# Patient Record
Sex: FEMALE | Race: BLACK OR AFRICAN AMERICAN | Employment: UNEMPLOYED | URBAN - METROPOLITAN AREA
[De-identification: names, ages, dates, MRNs, and addresses within clinical notes are randomized per-mention and may not be internally consistent; named-entity substitution may affect disease eponyms.]

---

## 2018-09-22 ENCOUNTER — IP HISTORICAL/CONVERTED ENCOUNTER (OUTPATIENT)
Dept: OTHER | Age: 58
End: 2018-09-22

## 2018-09-26 ENCOUNTER — ED HISTORICAL/CONVERTED ENCOUNTER (OUTPATIENT)
Dept: OTHER | Age: 58
End: 2018-09-26

## 2019-02-09 ENCOUNTER — OFFICE VISIT (OUTPATIENT)
Dept: FAMILY PLANNING/WOMEN'S HEALTH CLINIC | Age: 59
End: 2019-02-09

## 2019-02-09 ENCOUNTER — HOSPITAL ENCOUNTER (OUTPATIENT)
Dept: MAMMOGRAPHY | Age: 59
Discharge: HOME | End: 2019-02-09
Attending: NURSE PRACTITIONER

## 2019-02-09 ENCOUNTER — HOSPITAL ENCOUNTER (OUTPATIENT)
Dept: LAB | Age: 59
Discharge: HOME | End: 2019-02-09

## 2019-02-09 VITALS — DIASTOLIC BLOOD PRESSURE: 98 MMHG | SYSTOLIC BLOOD PRESSURE: 150 MMHG

## 2019-02-09 DIAGNOSIS — Z12.31 SCREENING MAMMOGRAM, ENCOUNTER FOR: ICD-10-CM

## 2019-02-09 DIAGNOSIS — Z12.31 SCREENING MAMMOGRAM, ENCOUNTER FOR: Primary | ICD-10-CM

## 2019-02-09 DIAGNOSIS — Z01.419 WELL WOMAN EXAM WITH ROUTINE GYNECOLOGICAL EXAM: ICD-10-CM

## 2019-02-09 PROCEDURE — 88175 CYTOPATH C/V AUTO FLUID REDO: CPT

## 2019-02-09 PROCEDURE — 77067 SCR MAMMO BI INCL CAD: CPT

## 2019-02-09 NOTE — PROGRESS NOTES
EVERY WOMANS LIFE HISTORY QUESTIONNAIRE       No Yes Comments   Has a doctor ever seen or felt anything wrong with your breast? [x]                                  []                                     Have you ever had a breast biopsy? [x]                                  []                                          When and where was last mammogram performed? [facility] about 4 years ago, states she had ultrasound. Program through [facility] and it was free. Have you ever been told that there was a problem on your mammogram?   No Yes Comments   [x]                                  []                                       Do you have breast implants? No Yes Comments   [x]                                  []                                       When was your last Pap test performed? 3 years ago in [facility]    Have you ever had an abnormal Pap test?   No Yes Comments   [x]                                  []                                       Have you had a hysterectomy? No Yes Comments (why)   [x]                                  []                                       Have you ever been diagnosed with any type of Cancer   No Yes Comments (type,when,where,type of treatment   [x]                                  []                                          Has a family member been diagnosed with breast or ovarian cancer? No Yes Comments (which family members, and type   [x]                                  []                                       Did your mother take DELROY? No Yes Unknown   [x]                                  []                                       Do you have a history of HIV exposure? No Yes    [x]                                  []                                         Have you been through menopause?    No Yes Date of LMP   []                                  [x]                                  26 years ago     Are you taking hormone replacement therapy (HRT) No Yes Comments   [x]                                  []                                       How many times have you been pregnant? 2          Number of live births ? 1    Are you experiencing any of the following? No Yes Comments   Nipple Discharge [x]                                  []                                     Breast Lump/Masses [x]                                  []                                     Breast Skin Changes [x]                                  []                                          No Yes Comments   Vaginal Discharge [x]                                  []                                     Abnormal/unusual vaginal bleeding [x]                                  []                                         Are you experiencing any other health problems?  On blood pressure med, unsure of name

## 2019-02-09 NOTE — PROGRESS NOTES
HISTORY OF PRESENT ILLNESS  Roel Varner is a 60 y.o. female. HPI   Ms. Varner is here for her EWL exam.  57yoF,   She denies abnormal SBE's, performs monthly. She denies abnormal vaginal discharge and bloating; no pelvic pain. LMP at age 34. Her last mammogram was about 4 years ago at [facility]. Her last Pap was at age 47. She is followed by a clinic down there for her BP. Today she has not taken her BP meds yet and is aware her BP is elevated. She recently had a colonosocpy- next one due in 10 years. Non-smoker. Review of Systems   Constitutional: Negative for chills, diaphoresis, malaise/fatigue and weight loss. Gastrointestinal: Negative for abdominal pain, blood in stool, constipation and diarrhea. Physical Exam   Constitutional: She is oriented to person, place, and time. She appears well-developed and well-nourished. Pulmonary/Chest: Right breast exhibits no inverted nipple, no mass, no nipple discharge, no skin change and no tenderness. Left breast exhibits no inverted nipple, no mass, no nipple discharge, no skin change and no tenderness. Breasts are symmetrical.   Genitourinary: Rectum normal, vagina normal and uterus normal. Rectal exam shows no mass, no tenderness and guaiac negative stool. Pelvic exam was performed with patient supine. There is no rash, tenderness or lesion on the right labia. There is no rash, tenderness or lesion on the left labia. Cervix exhibits no motion tenderness, no discharge and no friability. Right adnexum displays no mass, no tenderness and no fullness. Left adnexum displays no mass, no tenderness and no fullness. No erythema, tenderness or bleeding in the vagina. No vaginal discharge found. Lymphadenopathy:     She has no axillary adenopathy. Right: No inguinal and no supraclavicular adenopathy present. Left: No inguinal and no supraclavicular adenopathy present.    Neurological: She is alert and oriented to person, place, and time. Skin: Skin is warm and dry. Psychiatric: She has a normal mood and affect. Her behavior is normal. Thought content normal.   Nursing note and vitals reviewed. ASSESSMENT and PLAN  1. EWL exam  2. CBE benign  3. Screening mammogram today      -last mammogram 2015  4. Pap, Thin Prep obtained  5. BP GL's discussed per JNC8      -monitor BP x3 readings this week and report elevated findings to PCP  6.  Post-menopause      -LMP age 34

## 2019-06-12 ENCOUNTER — ED HISTORICAL/CONVERTED ENCOUNTER (OUTPATIENT)
Dept: OTHER | Age: 59
End: 2019-06-12

## 2019-09-01 ENCOUNTER — ED HISTORICAL/CONVERTED ENCOUNTER (OUTPATIENT)
Dept: OTHER | Age: 59
End: 2019-09-01

## 2020-06-01 ENCOUNTER — OP HISTORICAL/CONVERTED ENCOUNTER (OUTPATIENT)
Dept: OTHER | Age: 60
End: 2020-06-01

## 2020-08-18 ENCOUNTER — ED HISTORICAL/CONVERTED ENCOUNTER (OUTPATIENT)
Dept: OTHER | Age: 60
End: 2020-08-18

## 2020-12-29 ENCOUNTER — HOSPITAL ENCOUNTER (EMERGENCY)
Age: 60
Discharge: HOME | End: 2020-12-29
Attending: FAMILY MEDICINE
Payer: MEDICAID

## 2020-12-29 ENCOUNTER — APPOINTMENT (OUTPATIENT)
Dept: GENERAL RADIOLOGY | Age: 60
End: 2020-12-29
Attending: FAMILY MEDICINE
Payer: MEDICAID

## 2020-12-29 VITALS
RESPIRATION RATE: 16 BRPM | SYSTOLIC BLOOD PRESSURE: 152 MMHG | DIASTOLIC BLOOD PRESSURE: 93 MMHG | HEART RATE: 58 BPM | TEMPERATURE: 98.4 F | HEIGHT: 65 IN | BODY MASS INDEX: 27.49 KG/M2 | OXYGEN SATURATION: 100 % | WEIGHT: 165 LBS

## 2020-12-29 DIAGNOSIS — M94.0 COSTOCHONDRITIS: Primary | ICD-10-CM

## 2020-12-29 DIAGNOSIS — R51.9 ACUTE NONINTRACTABLE HEADACHE, UNSPECIFIED HEADACHE TYPE: ICD-10-CM

## 2020-12-29 LAB
ALBUMIN SERPL-MCNC: 4.1 G/DL (ref 3.5–5)
ALBUMIN/GLOB SERPL: 0.9 {RATIO} (ref 1.1–2.2)
ALP SERPL-CCNC: 69 U/L (ref 45–117)
ALT SERPL-CCNC: 19 U/L (ref 12–78)
ANION GAP SERPL CALC-SCNC: 9 MMOL/L (ref 5–15)
AST SERPL W P-5'-P-CCNC: 16 U/L (ref 15–37)
BILIRUB SERPL-MCNC: 0.3 MG/DL (ref 0.2–1)
BUN SERPL-MCNC: 21 MG/DL (ref 6–20)
BUN/CREAT SERPL: 23 (ref 12–20)
CA-I BLD-MCNC: 9.7 MG/DL (ref 8.5–10.1)
CHLORIDE SERPL-SCNC: 101 MMOL/L (ref 97–108)
CO2 SERPL-SCNC: 29 MMOL/L (ref 21–32)
CREAT SERPL-MCNC: 0.93 MG/DL (ref 0.55–1.02)
GLOBULIN SER CALC-MCNC: 4.7 G/DL (ref 2–4)
GLUCOSE SERPL-MCNC: 108 MG/DL (ref 65–100)
POTASSIUM SERPL-SCNC: 3.2 MMOL/L (ref 3.5–5.1)
PROT SERPL-MCNC: 8.8 G/DL (ref 6.4–8.2)
SODIUM SERPL-SCNC: 139 MMOL/L (ref 136–145)
TROPONIN I SERPL-MCNC: <0.05 NG/ML

## 2020-12-29 PROCEDURE — 71045 X-RAY EXAM CHEST 1 VIEW: CPT

## 2020-12-29 PROCEDURE — 84484 ASSAY OF TROPONIN QUANT: CPT

## 2020-12-29 PROCEDURE — 99284 EMERGENCY DEPT VISIT MOD MDM: CPT

## 2020-12-29 PROCEDURE — 96374 THER/PROPH/DIAG INJ IV PUSH: CPT

## 2020-12-29 PROCEDURE — 74011250636 HC RX REV CODE- 250/636: Performed by: FAMILY MEDICINE

## 2020-12-29 PROCEDURE — 93005 ELECTROCARDIOGRAM TRACING: CPT

## 2020-12-29 PROCEDURE — 80053 COMPREHEN METABOLIC PANEL: CPT

## 2020-12-29 PROCEDURE — 36415 COLL VENOUS BLD VENIPUNCTURE: CPT

## 2020-12-29 RX ORDER — POTASSIUM CHLORIDE 600 MG/1
8 TABLET, FILM COATED, EXTENDED RELEASE ORAL 2 TIMES DAILY
COMMUNITY

## 2020-12-29 RX ORDER — KETOROLAC TROMETHAMINE 30 MG/ML
15 INJECTION, SOLUTION INTRAMUSCULAR; INTRAVENOUS ONCE
Status: COMPLETED | OUTPATIENT
Start: 2020-12-29 | End: 2020-12-29

## 2020-12-29 RX ORDER — KETOROLAC TROMETHAMINE 10 MG/1
10 TABLET, FILM COATED ORAL
Qty: 20 TAB | Refills: 0 | Status: SHIPPED | OUTPATIENT
Start: 2020-12-29 | End: 2021-01-03

## 2020-12-29 RX ORDER — CHLORTHALIDONE 25 MG/1
TABLET ORAL DAILY
COMMUNITY

## 2020-12-29 RX ADMIN — KETOROLAC TROMETHAMINE 15 MG: 30 INJECTION, SOLUTION INTRAMUSCULAR at 14:30

## 2020-12-29 NOTE — ED TRIAGE NOTES
Yesterday sitting talking with sudden constantonset of sharp cp and headache sternal area, hurts to take deep breathand tender to palpation, No epigastric pain, took 1 81mg aspirin.  No N, V, or diarrhea  No radiation to neck jaw or arm.  Never had this before.  No hx of heart attack in past.

## 2020-12-29 NOTE — LETTER
[address] [facility] EMERGENCY DEPARTMENT  [address] [facility] [address]  [phone]    Work/School Note    Date: 12/29/2020    To Whom It May concern:    Delaney Xiong was seen and treated today in the emergency room by the following provider(s):  Attending Provider: Bharathi Pringle MD.      Delaney Xiong may return to work on December 31, 2020. Sincerely,      NAIF  [id number]

## 2020-12-29 NOTE — ED PROVIDER NOTES
[facility] EMERGENCY CARE CENTER    HISTORY AND PHYSICAL EXAM      Date: 12/29/2020  Patient Name: Geena Chou  YOB: 1960  MRN: [id number]  Room:  [place] [address]    History of Presenting Illness     Chief Complaint:  Chest Pain and Headache       History Provided By: Patient  HPI/ROS Limits: None    HPI: Chano Gaxiola is a 63 y.o. female presenting to the ED with CC of Chest Pain and Headache   with initial onset yesterday. The patient states that the sternal chest pain is sharp and constant, rated as moderate to severe, and deep breathing and palpation exacerbates the pain. The chest pain doesn't radiate. The patient to an 81 mg aspirin and the pain has persisted. The patient denies pain in the past and h/o ACS. The patient is also c/o dull low grade headache without any modifiers. Headache is similar to previous headaches. Currently, the patient denies F/C, neck pain, visual changes, dizziness, NVDC, abdominal pain, or  complaints. There are no other complaints, changes, or physical findings at this time. PCP: Chanel Romero NP    No current facility-administered medications on file prior to encounter. Current Outpatient Medications on File Prior to Encounter   Medication Sig Dispense Refill    chlorthalidone (HYGROTON) 25 mg tablet Take  by mouth daily.  potassium chloride SR (KLOR-CON 8) 8 mEq tablet Take 8 mEq by mouth two (2) times a day.  OTHER Take  by mouth daily. Past History     PAST MEDICAL  The patient  has a past medical history of Hypertension. PAST SURGICAL  The patient  has a past surgical history that includes hx gyn and hx orthopaedic. SOCIAL HISTORY  The patient  reports that she has quit smoking. She has never used smokeless tobacco. She reports that she does not drink alcohol or use drugs. Allergies: The patient is allergic to pcn [penicillins]; lisinopril; losartan; and rocephin [ceftriaxone].     Review of Systems Review of Systems   Constitutional: Negative. HENT: Negative. Eyes: Negative. Negative for visual disturbance. Respiratory: Negative for shortness of breath. Cardiovascular: Positive for chest pain. Gastrointestinal: Negative for abdominal pain. Endocrine: Negative. Genitourinary: Negative for dysuria, flank pain, frequency, urgency, vaginal bleeding and vaginal discharge. Musculoskeletal: Negative for back pain and neck pain. Skin: Negative. Allergic/Immunologic: Negative. Neurological: Positive for headaches. Hematological: Negative. Psychiatric/Behavioral: Negative. Physical Exam     Vital Signs-Reviewed the patient's vital signs. Patient Vitals for the past 24 hrs:   Temp Pulse Resp BP SpO2   12/29/20 1434  (!) 58 16 (!) 152/93 100 %   12/29/20 1253     100 %   12/29/20 1244 98.4 °F (36.9 °C) 78 16 (!) 156/105 100 %      Physical Exam  Vitals signs and nursing note reviewed. Constitutional:       Appearance: Normal appearance. HENT:      Head: Normocephalic and atraumatic. Mouth/Throat:      Mouth: Mucous membranes are moist.      Pharynx: Oropharynx is clear. Eyes:      Extraocular Movements: Extraocular movements intact. Conjunctiva/sclera: Conjunctivae normal.      Pupils: Pupils are equal, round, and reactive to light. Neck:      Musculoskeletal: Normal range of motion and neck supple. Cardiovascular:      Rate and Rhythm: Normal rate and regular rhythm. Pulmonary:      Effort: Pulmonary effort is normal.      Breath sounds: Normal breath sounds. Chest:       Abdominal:      General: Abdomen is flat. Palpations: Abdomen is soft. Musculoskeletal: Normal range of motion. Skin:     General: Skin is warm and dry. Neurological:      General: No focal deficit present. Mental Status: She is alert and oriented to person, place, and time.    Psychiatric:         Mood and Affect: Mood normal.         Behavior: Behavior normal. Diagnostic Study Results     Labs -     Recent Results (from the past 12 hour(s))   METABOLIC PANEL, COMPREHENSIVE    Collection Time: 12/29/20 12:49 PM   Result Value Ref Range    Sodium 139 136 - 145 mmol/L    Potassium 3.2 (L) 3.5 - 5.1 mmol/L    Chloride 101 97 - 108 mmol/L    CO2 29 21 - 32 mmol/L    Anion gap 9 5 - 15 mmol/L    Glucose 108 (H) 65 - 100 mg/dL    BUN 21 (H) 6 - 20 mg/dL    Creatinine 0.93 0.55 - 1.02 mg/dL    BUN/Creatinine ratio 23 (H) 12 - 20      GFR est AA >60 >60 ml/min/1.73m2    GFR est non-AA >60 >60 ml/min/1.73m2    Calcium 9.7 8.5 - 10.1 mg/dL    Bilirubin, total 0.3 0.2 - 1.0 mg/dL    AST (SGOT) 16 15 - 37 U/L    ALT (SGPT) 19 12 - 78 U/L    Alk. phosphatase 69 45 - 117 U/L    Protein, total 8.8 (H) 6.4 - 8.2 g/dL    Albumin 4.1 3.5 - 5.0 g/dL    Globulin 4.7 (H) 2.0 - 4.0 g/dL    A-G Ratio 0.9 (L) 1.1 - 2.2     TROPONIN I    Collection Time: 12/29/20 12:49 PM   Result Value Ref Range    Troponin-I, Qt. <0.05 <0.05 ng/mL       Radiologic Studies -   XR CHEST SNGL V   Final Result   Impression:    No acute cardiopulmonary abnormality. CT Results  (Last 48 hours)    None        CXR Results  (Last 48 hours)               12/29/20 1304  XR CHEST SNGL V Final result    Impression:  Impression:    No acute cardiopulmonary abnormality. Narrative:  Clinical history: Chest pain       Comparison: Chest radiograph 6/12/2019. Findings:    Single view chest. The lungs are adequately expanded and clear. No pleural   effusion or pneumothorax. The cardiac silhouette is normal in size. No pulmonary   edema. The osseous structures are intact. The results of the diagnostic studies, performed during the time frame I've seen and evaluated the patient, have been REVIEWED BY MYSELF. Procedures/Critical Care     None    Medical Decision Making   I am the first provider for this patient.     I reviewed the vital signs, available nursing notes, past medical history, past surgical history, family history and social history. Records Reviewed: Nursing Notes    ED Course:   Initial assessment performed. The patients presenting problems have been discussed, and they are in agreement with the care plan formulated and outlined with them. I have encouraged them to ask questions as they arise throughout their visit. Medications   ketorolac (TORADOL) injection 15 mg (15 mg IntraVENous Given 12/29/20 1430)       Provider Notes (Medical Decision Making): The patient presented to the emergency department with complaint of chest pain and headache. Evaluation of the patient is significant for reproducible chest pain with palpation. Diagnostic (laboratory/radiographic) evaluation revealed normal ECG and negative troponin. Based on history and physical exam labs not obtained. Symptoms are not suggestive ACS, PE, pneumonia, aortic dissection, or other serious etiology. These diagnoses have been considered and excluded clinically. Given the low risk of these diagnoses further testing and evaluation does not appear to be indicated at this time. If ordered, results of lab/radiology tests were reviewed and discussed with the patient and/or family. Diagnostic impression and plan were discussed and agreed upon with the patient and/or family. The patient has received maximum benefit from this visit and felt eligible for discharge. The patient received IV toradol with improvement of headache and chest pain. Rx for NSAIDs recommended for management. Chest pain precautions were given. All questions were answered and concerns addressed. The patient was advised to follow-up with the patient's primary care physician, and that should the symptoms worsen or change in anyway that they are to return to the ER immediately for re-evaluation. Patient discharged in stable condition. Diagnosis/Plan/Follow Up     CLINICAL IMPRESSION:      ICD-10-CM ICD-9-CM   1.  Costochondritis  M94.0 733.6 2. Acute nonintractable headache, unspecified headache type  R51.9 784.0        DISPOSITION:  Discharged to Home or Self Care in stable condition. PLAN/FOLLOW UP  1. Discharge Medication List as of 12/29/2020  3:41 PM      START taking these medications    Details   ketorolac (TORADOL) 10 mg tablet Take 1 Tab by mouth every six (6) hours as needed for Pain for up to 5 days. , Normal, Disp-20 Tab, R-0         CONTINUE these medications which have NOT CHANGED    Details   chlorthalidone (HYGROTON) 25 mg tablet Take  by mouth daily. , Historical Med      potassium chloride SR (KLOR-CON 8) 8 mEq tablet Take 8 mEq by mouth two (2) times a day., Historical Med      OTHER Take  by mouth daily. , Historical Med           2. The patient's results have been reviewed with them. The patient has been counseled regarding their diagnosis. The patient verbally conveys understanding and agreement of the signs, symptoms, diagnosis, treatment and prognosis and additionally agrees to follow up as recommended with their PCP. The patient also agrees with the care-plan and conveys that all of their questions have been answered. I have also put together some discharge instructions for them that include: 1) educational information regarding their diagnosis, 2) how to care for their diagnosis at home, as well as a 3) list of reasons why they would want to return to the ED prior to their follow-up appointment, should their condition change. Follow-up Information     Follow up With Specialties Details Why Contact Chelo Dong NP Nurse Practitioner Schedule an appointment as soon as possible for a visit   KENYA Banks [address] [facility] [address]      [address] [facility] Emergency Medicine Go to  If symptoms worsen [address] [facility]  [phone]        Return to ED if worse       CHICHO Stiles M.D.   [facility]  Emergency Department Physician

## 2021-05-24 ENCOUNTER — TRANSCRIBE ORDER (OUTPATIENT)
Dept: SCHEDULING | Age: 61
End: 2021-05-24

## 2021-05-24 DIAGNOSIS — Z12.31 VISIT FOR SCREENING MAMMOGRAM: Primary | ICD-10-CM

## 2021-06-16 ENCOUNTER — HOSPITAL ENCOUNTER (OUTPATIENT)
Dept: MAMMOGRAPHY | Age: 61
Discharge: HOME | End: 2021-06-16
Attending: FAMILY MEDICINE
Payer: MEDICAID

## 2021-06-16 DIAGNOSIS — Z12.31 VISIT FOR SCREENING MAMMOGRAM: ICD-10-CM

## 2021-06-16 PROCEDURE — 77063 BREAST TOMOSYNTHESIS BI: CPT

## 2021-08-10 ENCOUNTER — HOSPITAL ENCOUNTER (OUTPATIENT)
Dept: MAMMOGRAPHY | Age: 61
Discharge: HOME | End: 2021-08-10
Attending: FAMILY MEDICINE
Payer: MEDICAID

## 2021-08-10 DIAGNOSIS — R92.8 ABNORMAL MAMMOGRAM: ICD-10-CM

## 2021-08-10 PROCEDURE — 76642 ULTRASOUND BREAST LIMITED: CPT

## 2021-08-10 PROCEDURE — 77061 BREAST TOMOSYNTHESIS UNI: CPT

## 2023-05-15 RX ORDER — CHLORTHALIDONE 25 MG/1
TABLET ORAL DAILY
COMMUNITY

## 2023-05-15 RX ORDER — POTASSIUM CHLORIDE 600 MG/1
8 TABLET, FILM COATED, EXTENDED RELEASE ORAL 2 TIMES DAILY
COMMUNITY